# Patient Record
Sex: FEMALE | Race: WHITE | URBAN - METROPOLITAN AREA
[De-identification: names, ages, dates, MRNs, and addresses within clinical notes are randomized per-mention and may not be internally consistent; named-entity substitution may affect disease eponyms.]

---

## 2022-02-10 LAB
AMB EXT HPV: POSITIVE
AMB EXT HPV: POSITIVE
HIV RESULT OB: NEGATIVE
HIV RESULT OB: NEGATIVE
RAPID PLASMA REAGIN OB: NONREACTIVE
RAPID PLASMA REAGIN OB: NONREACTIVE

## 2022-08-19 ENCOUNTER — TELEPHONE (OUTPATIENT)
Dept: OBGYN CLINIC | Facility: CLINIC | Age: 27
End: 2022-08-19

## 2022-08-19 NOTE — TELEPHONE ENCOUNTER
Patient recently moved to the area from [place]. Is due to deliver on 9/11 and would like to be seen in this office. Records are being faxed. Please call to advise.

## 2022-08-20 ENCOUNTER — TELEPHONE (OUTPATIENT)
Dept: OBGYN CLINIC | Facility: CLINIC | Age: 27
End: 2022-08-20

## 2022-08-20 ENCOUNTER — HOSPITAL ENCOUNTER (OUTPATIENT)
Facility: HOSPITAL | Age: 27
Discharge: HOME | End: 2022-08-20
Attending: OBSTETRICS & GYNECOLOGY | Admitting: OBSTETRICS & GYNECOLOGY

## 2022-08-20 VITALS — SYSTOLIC BLOOD PRESSURE: 112 MMHG | DIASTOLIC BLOOD PRESSURE: 66 MMHG | HEART RATE: 92 BPM

## 2022-08-20 PROBLEM — O47.9 IRREGULAR CONTRACTIONS: Status: ACTIVE | Noted: 2022-08-20

## 2022-08-20 LAB
ALBUMIN SERPL-MCNC: 2.7 G/DL (ref 3.4–5)
ALBUMIN/GLOB SERPL: 0.7 {RATIO} (ref 1–2)
ALP LIVER SERPL-CCNC: 82 U/L
ALT SERPL-CCNC: 13 U/L
AMB EXT HEMATOCRIT: 34.7
AMB EXT HEMATOCRIT: 34.7
AMB EXT HEMOGLOBIN: 11.4
AMB EXT HEMOGLOBIN: 11.4
AMB EXT PLATELETS: 199
AMB EXT PLATELETS: 199
AMB EXT TREPONEMAL ANTIBODIES: NEGATIVE
AMB EXT TREPONEMAL ANTIBODIES: NEGATIVE
ANION GAP SERPL CALC-SCNC: 6 MMOL/L (ref 0–18)
ANTIBODY SCREEN OB: NEGATIVE
ANTIBODY SCREEN OB: NEGATIVE
ANTIBODY SCREEN: NEGATIVE
AST SERPL-CCNC: 11 U/L (ref 15–37)
BASOPHILS # BLD AUTO: 0.01 X10(3) UL (ref 0–0.2)
BASOPHILS NFR BLD AUTO: 0.1 %
BILIRUB SERPL-MCNC: 0.3 MG/DL (ref 0.1–2)
BUN BLD-MCNC: 7 MG/DL (ref 7–18)
BUN/CREAT SERPL: 13.5 (ref 10–20)
CALCIUM BLD-MCNC: 8.5 MG/DL (ref 8.5–10.1)
CHLORIDE SERPL-SCNC: 108 MMOL/L (ref 98–112)
CO2 SERPL-SCNC: 23 MMOL/L (ref 21–32)
CREAT BLD-MCNC: 0.52 MG/DL
DEPRECATED RDW RBC AUTO: 46.9 FL (ref 35.1–46.3)
EOSINOPHIL # BLD AUTO: 0 X10(3) UL (ref 0–0.7)
EOSINOPHIL NFR BLD AUTO: 0 %
ERYTHROCYTE [DISTWIDTH] IN BLOOD BY AUTOMATED COUNT: 13.8 % (ref 11–15)
GFR SERPLBLD BASED ON 1.73 SQ M-ARVRAT: 131 ML/MIN/1.73M2 (ref 60–?)
GLOBULIN PLAS-MCNC: 4.1 G/DL (ref 2.8–4.4)
GLUCOSE BLD-MCNC: 95 MG/DL (ref 70–99)
HCT VFR BLD AUTO: 34.7 %
HEPATITIS B SURFACE ANTIGEN OB: NEGATIVE
HEPATITIS B SURFACE ANTIGEN OB: NEGATIVE
HGB BLD-MCNC: 11.4 G/DL
HIV RESULT OB: NEGATIVE
HIV RESULT OB: NEGATIVE
IMM GRANULOCYTES # BLD AUTO: 0.04 X10(3) UL (ref 0–1)
IMM GRANULOCYTES NFR BLD: 0.4 %
LYMPHOCYTES # BLD AUTO: 1.41 X10(3) UL (ref 1–4)
LYMPHOCYTES NFR BLD AUTO: 14.5 %
MCH RBC QN AUTO: 30.4 PG (ref 26–34)
MCHC RBC AUTO-ENTMCNC: 32.9 G/DL (ref 31–37)
MCV RBC AUTO: 92.5 FL
MONOCYTES # BLD AUTO: 0.55 X10(3) UL (ref 0.1–1)
MONOCYTES NFR BLD AUTO: 5.7 %
NEUTROPHILS # BLD AUTO: 7.72 X10 (3) UL (ref 1.5–7.7)
NEUTROPHILS # BLD AUTO: 7.72 X10(3) UL (ref 1.5–7.7)
NEUTROPHILS NFR BLD AUTO: 79.3 %
OSMOLALITY SERPL CALC.SUM OF ELEC: 282 MOSM/KG (ref 275–295)
PLATELET # BLD AUTO: 199 10(3)UL (ref 150–450)
POTASSIUM SERPL-SCNC: 3.7 MMOL/L (ref 3.5–5.1)
PROT SERPL-MCNC: 6.8 G/DL (ref 6.4–8.2)
RBC # BLD AUTO: 3.75 X10(6)UL
RH BLOOD TYPE: POSITIVE
RH FACTOR OB: POSITIVE
RH FACTOR OB: POSITIVE
RUBV IGG SER-ACNC: 8.2 IU/ML (ref 10–?)
SODIUM SERPL-SCNC: 137 MMOL/L (ref 136–145)
WBC # BLD AUTO: 9.7 X10(3) UL (ref 4–11)

## 2022-08-20 PROCEDURE — 59025 FETAL NON-STRESS TEST: CPT | Performed by: OBSTETRICS & GYNECOLOGY

## 2022-08-20 NOTE — PROGRESS NOTES
Pt is a 32year old female admitted to TR2/TR2-A. Patient presents with:  Contractions: every 5 min for past few days  R/o Pih: c/o headache for past couple days. Pt has not taken Tylenol     Pt is [id number] 36w6d intra-uterine pregnancy. History obtained, consents signed. Oriented to room, staff, and plan of care.

## 2022-08-21 LAB — RH BLOOD TYPE: POSITIVE

## 2022-08-21 NOTE — TRIAGE
99 [facility] Patient Status:  Observation    1995 MRN [id number]   Location [address] [place] [address] Attending Danish Goodwin MD   Hosp Day # 0 PCP No primary care provider on file. Date of Admission:  2022    HPI:an estimated date of delivery of: 2022, by Patient Reported     Gwen Lopez is a 32year old.o. [id number]  female with No LMP recorded. Patient is pregnant. and with an estimated date of delivery of 2022, per patient by LMP consistent with ultrasound and current EGA of 52Y1A    Gwen Lopez presents with some mild cramping. She states that she moved here from [place] approximately 1 month ago and has not had any prenatal care here so wanted to come in and be checked out. She states that she had prenatal care in [place] but does not have a copy of her records. She has not been able to get into an office for prenatal care because she does not have records available. She has had normal fetal movement. She denies any significant contractions or bleeding. Patient denies spontaneous rupture membranes. Prenatal Care   Prenatal Care in [place] until 1 month ago  Prenatal Labs were all ordered in case patient returns in labor  Prenatal Results     Initial     Test Value Reference Range Date Time    Blood Type (ABO Group)  O   22    Rh Factor  Positive   22    Antibody Screen (Required questions in OE to answer)        HCT        HGB        MCV        Platelets        Rubella  Equivocal  Positive 22    TREP        RPR ([facility])        Urine Culture        Hepatitis B        HIV Combo        HCV                         Legend    ^: Historical                      Complications:  1. [address] multigravid pregnancy  2.   History of  delivery with a 36-week delivery, 4 deliveries that were term    History   Obstetric History:   OB History    Para Term  AB Living   7 5 4 1 1 5   SAB IAB Ectopic Multiple Live Births                  # Outcome Date GA Lbr Jonn/2nd Weight Sex Delivery Anes PTL Lv   7 Current            6 Term 20           5 Term 17     NORMAL SPONT      4 AB 16     SAB      3 Term 06/13/15     NORMAL SPONT      2  13     NORMAL SPONT      1 Term 10/02/12     NORMAL SPONT        Past Medical History: History reviewed. No pertinent past medical history. Past Social History: History reviewed. No pertinent surgical history. Family History: No family history on file. Social History: Social History    Tobacco Use      Smoking status: Never Smoker      Smokeless tobacco: Never Used    Alcohol use: Not Currently     Allergies/Medications: Allergies:   No Known Allergies  Medications:  No medications prior to admission.     Review of Systems:   As documented in HPI    Physical Exam:   Pulse:  [92] 92  BP: (112)/(66) 112/66  Patient Vitals for the past 24 hrs:   BP Pulse   22 1815 112/66 92       Constitutional: alert and cooperative  Respiratory: clear to auscultation bilaterally  Cardiac: regular rate and rhythm  Abdomen: Gravid, nontender uterus with fundal height approximately 3536 cm  Fetal Surveillance:   140 BPM; Fetal heart variability: moderate  Fetal Heart Rate decelerations: none  Fetal Heart Rate accelerations: yes  Uterine contractions: Infrequent  Sterile vaginal exam: Dilation: 1+ centimeters dilated  Effacement: [address]: -2  Position: [address] [facility] admission results:   @  Recent Results (from the past 24 hour(s))   Comp Metabolic Panel (14)    Collection Time: 22  6:59 PM   Result Value Ref Range    Glucose 95 70 - 99 mg/dL    Sodium 137 136 - 145 mmol/L    Potassium 3.7 3.5 - 5.1 mmol/L    Chloride 108 98 - 112 mmol/L    CO2 23.0 21.0 - 32.0 mmol/L    Anion Gap 6 0 - 18 mmol/L    BUN 7 7 - 18 mg/dL    Creatinine 0.52 (L) 0.55 - 1.02 mg/dL    BUN/CREA Ratio 13.5 10.0 - 20.0    Calcium, Total 8.5 8.5 - 10.1 mg/dL    Calculated Osmolality 282 275 - 295 mOsm/kg    eGFR-Cr 131 >=60 mL/min/1.73m2    ALT 13 13 - 56 U/L    AST 11 (L) 15 - 37 U/L    Alkaline Phosphatase 82 37 - 98 U/L    Bilirubin, Total 0.3 0.1 - 2.0 mg/dL    Total Protein 6.8 6.4 - 8.2 g/dL    Albumin 2.7 (L) 3.4 - 5.0 g/dL    Globulin  4.1 2.8 - 4.4 g/dL    A/G Ratio 0.7 (L) 1.0 - 2.0   Rubella, IGG    Collection Time: 08/20/22  6:59 PM   Result Value Ref Range    Rubella IgG Quantitative 8.868244 (L) >=10 IU/mL    Rubella IgG Equivocal (A) Positive   ABORH (Blood Type)    Collection Time: 08/20/22  6:59 PM   Result Value Ref Range    ABO BLOOD TYPE O     RH BLOOD TYPE Positive    Antibody Screen    Collection Time: 08/20/22  6:59 PM   Result Value Ref Range    Antibody Screen Negative    CBC W/ DIFFERENTIAL    Collection Time: 08/20/22  6:59 PM   Result Value Ref Range    WBC 9.7 4.0 - 11.0 x10(3) uL    RBC 3.75 (L) 3.80 - 5.30 x10(6)uL    HGB 11.4 (L) 12.0 - 16.0 g/dL    HCT 34.7 (L) 35.0 - 48.0 %    MCV 92.5 80.0 - 100.0 fL    MCH 30.4 26.0 - 34.0 pg    MCHC 32.9 31.0 - 37.0 g/dL    RDW-SD 46.9 (H) 35.1 - 46.3 fL    RDW 13.8 11.0 - 15.0 %    .0 150.0 - 450.0 10(3)uL    Neutrophil Absolute Prelim 7.72 (H) 1.50 - 7.70 x10 (3) uL    Neutrophil Absolute 7.72 (H) 1.50 - 7.70 x10(3) uL    Lymphocyte Absolute 1.41 1.00 - 4.00 x10(3) uL    Monocyte Absolute 0.55 0.10 - 1.00 x10(3) uL    Eosinophil Absolute 0.00 0.00 - 0.70 x10(3) uL    Basophil Absolute 0.01 0.00 - 0.20 x10(3) uL    Immature Granulocyte Absolute 0.04 0.00 - 1.00 x10(3) uL    Neutrophil % 79.3 %    Lymphocyte % 14.5 %    Monocyte % 5.7 %    Eosinophil % 0.0 %    Basophil % 0.1 %    Immature Granulocyte % 0.4 %   HIV Ag/Ab Combo    Collection Time: 08/20/22  6:59 PM   Result Value Ref Range    HIV Antigen Antibody Combo Non-Reactive Non-Reactive   @    Prenatal results:     Lab Results   Component Value Date    HIV Non-Reactive 08/20/2022    ABO O 08/20/2022    RH Positive 08/20/2022 WBC 9.7 08/20/2022    HGB 11.4 (L) 08/20/2022    HCT 34.7 (L) 08/20/2022    .0 08/20/2022    CREATSERUM 0.52 (L) 08/20/2022    BUN 7 08/20/2022     08/20/2022    K 3.7 08/20/2022     08/20/2022    CO2 23.0 08/20/2022    GLU 95 08/20/2022    CA 8.5 08/20/2022    ALB 2.7 (L) 08/20/2022    ALKPHO 82 08/20/2022    BILT 0.3 08/20/2022    TP 6.8 08/20/2022    AST 11 (L) 08/20/2022    ALT 13 08/20/2022         Assessment/Plan:    Jaime Oviedo is a A: 32 y.o. [id number] at an estimated gestational age of 36w7d with an estimated date of delivery of 9/11/2022, by Patient Reported presented with irregular contractions, not in labor. Fetal heart tones reassuring with category 1 tracing. Patient with no current prenatal care but states she had previous prenatal care up to approximately 32 weeks. Initial prenatal labs were drawn in the event the patient returns in labor. I counseled patient on recommendation to get records in a contact offices to look for prenatal care. Initial prenatal labs were ordered.     Admission problem(s):    Irregular contractions      Arnoldo Marina MD, MD  8/20/2022  10:15 PM

## 2022-08-21 NOTE — PROGRESS NOTES
Discharged to home per ambulatory in stable condition with written and verbal instructions. Encouraged pt to RTC for decreased fetal movement, vaginal bleeding, leaking of vaginal fluid, or more than 4 contractions in one hour until midnight and then until contractions Q 5min. Patient verbalizes understanding of information given.      Poonam Mcintyre RN

## 2022-08-21 NOTE — TRIAGE
[facility] [place] [facility]      Triage Note    Kari Record Patient Status:  Observation    1995 MRN [id number]   Location [address] Attending Angelica Patino MD   Hosp Day # 0 PCP No primary care provider on file.      Onelia Primus: [id number]  Estimated Date of Delivery: 22  Gestation: 36w6d    Chief Complaint     Contractions; R/o Pih          Allergies:  No Known Allergies    Orders Placed This Encounter      CBC With Differential With Platelet      Comp Metabolic Panel (14)      Rubella, IGG      HIV Ag/Ab Combo      T Pallidum Screening [place]      HIV Ag/Ab Combo      HIV Ag/Ab Combo Lavender Hold      Type and screen      ABORH (Blood Type)      Antibody Screen      ABORH Confirmation      Strep B Culture      Lab Results   Component Value Date    WBC 9.7 2022    HGB 11.4 (L) 2022    HCT 34.7 (L) 2022    .0 2022    CREATSERUM 0.52 (L) 2022    BUN 7 2022     2022    K 3.7 2022     2022    CO2 23.0 2022    GLU 95 2022    CA 8.5 2022    ALB 2.7 (L) 2022    ALKPHO 82 2022    BILT 0.3 2022    TP 6.8 2022    AST 11 (L) 2022    ALT 13 2022       Clinitek UA  No results found for: Alric Leonardo, GLUUR, URINEBILI, URINEKETONE, SPECGRAVITY, PHUR, PROTURINE, UROBILI, URINENITRITE, [place], URINECUL    UA  No results found for: COLORUR, CLARITY, SPECGRAVITY, PROUR, GLUUR, KETUR, BILUR, BLOODURINE, NITRITE, UROBILINOGEN, LEUUR, UASA     22  1815   BP: 112/66   Pulse: 92       NST  Variability: Moderate           Accelerations: Yes           Decelerations: None            Baseline: 135 BPM           Uterine Irritability: No           Contractions: Irregular                                                    Nonstress Test Interpretation: Reactive           Nonstress Test Second Interpretation: Reactive          FHR Category: Category I             Additional Comments Comments: [id number] presenting with c/o UCs, SVE 1.5/0/-2, reports UCs mild, just moved from [place] and unable to establish care, routine OB labs ordered, labs unremarkable and pt cleared for DC home     Patient presents with:  Contractions: every 5 min for past few days  R/o Pih: c/o headache for past couple days. Pt has not taken Tylenol    Labor danger S&S rev'd. Contact info provided for [address] [place] [address] ob providers. Enc pt to establish [facility].     Pola Tai RN  8/20/2022 9:32 PM

## 2022-08-22 LAB
AMB EXT GLUCOSE 1HR OB: 144
AMB EXT GLUCOSE 1HR OB: 144
GROUP B STREP BY PCR FOR PCR OVT: POSITIVE
T PALLIDUM AB SER QL: NEGATIVE

## 2022-08-24 PROBLEM — O99.820 GROUP B STREPTOCOCCAL CARRIAGE COMPLICATING PREGNANCY: Status: ACTIVE | Noted: 2022-08-24

## 2022-08-25 LAB
AMB EXT 1 HR GLUCOSE GESTATIONAL: 172
AMB EXT 1 HR GLUCOSE GESTATIONAL: 172
AMB EXT 2 HR GLUCOSE GESTATIONAL: 111
AMB EXT 2 HR GLUCOSE GESTATIONAL: 111
AMB EXT 3 HR GLUCOSE GESTATIONAL: 61
AMB EXT 3 HR GLUCOSE GESTATIONAL: 61
AMB EXT FASTING GLUCOSE GESTATIONAL: 85
AMB EXT FASTING GLUCOSE GESTATIONAL: 85

## 2022-08-31 ENCOUNTER — HOSPITAL ENCOUNTER (EMERGENCY)
Facility: HOSPITAL | Age: 27
Discharge: STILL ADMITTED OR ADMINISTRATIVE | End: 2022-08-31
Payer: MEDICAID

## 2022-08-31 ENCOUNTER — HOSPITAL ENCOUNTER (OUTPATIENT)
Facility: HOSPITAL | Age: 27
Discharge: HOME | End: 2022-08-31
Attending: OBSTETRICS & GYNECOLOGY | Admitting: OBSTETRICS & GYNECOLOGY
Payer: MEDICAID

## 2022-08-31 ENCOUNTER — HOSPITAL ENCOUNTER (EMERGENCY)
Facility: HOSPITAL | Age: 27
Discharge: STILL ADMITTED OR ADMINISTRATIVE | End: 2022-09-01
Payer: MEDICAID

## 2022-08-31 ENCOUNTER — HOSPITAL ENCOUNTER (OUTPATIENT)
Facility: HOSPITAL | Age: 27
Discharge: HOME | End: 2022-08-31
Attending: OBSTETRICS & GYNECOLOGY | Admitting: OBSTETRICS & GYNECOLOGY

## 2022-08-31 VITALS
BODY MASS INDEX: 30.18 KG/M2 | HEART RATE: 88 BPM | TEMPERATURE: 98 F | SYSTOLIC BLOOD PRESSURE: 102 MMHG | DIASTOLIC BLOOD PRESSURE: 56 MMHG | RESPIRATION RATE: 16 BRPM | WEIGHT: 164 LBS | HEIGHT: 62 IN

## 2022-08-31 VITALS — HEART RATE: 98 BPM | WEIGHT: 168 LBS | DIASTOLIC BLOOD PRESSURE: 66 MMHG | SYSTOLIC BLOOD PRESSURE: 116 MMHG

## 2022-08-31 PROBLEM — Z34.90 PREGNANCY: Status: ACTIVE | Noted: 2022-08-31

## 2022-08-31 LAB
ANTIBODY SCREEN: NEGATIVE
ANTIBODY SCREEN: NEGATIVE
BASOPHILS # BLD AUTO: 0.02 X10(3) UL (ref 0–0.2)
BASOPHILS # BLD AUTO: 0.02 X10(3) UL (ref 0–0.2)
BASOPHILS NFR BLD AUTO: 0.2 %
BASOPHILS NFR BLD AUTO: 0.2 %
DEPRECATED RDW RBC AUTO: 48.6 FL (ref 35.1–46.3)
DEPRECATED RDW RBC AUTO: 48.6 FL (ref 35.1–46.3)
EOSINOPHIL # BLD AUTO: 0.02 X10(3) UL (ref 0–0.7)
EOSINOPHIL # BLD AUTO: 0.02 X10(3) UL (ref 0–0.7)
EOSINOPHIL NFR BLD AUTO: 0.2 %
EOSINOPHIL NFR BLD AUTO: 0.2 %
ERYTHROCYTE [DISTWIDTH] IN BLOOD BY AUTOMATED COUNT: 14.4 % (ref 11–15)
ERYTHROCYTE [DISTWIDTH] IN BLOOD BY AUTOMATED COUNT: 14.4 % (ref 11–15)
HBV SURFACE AG SER-ACNC: <0.1 [IU]/L
HBV SURFACE AG SER-ACNC: <0.1 [IU]/L
HBV SURFACE AG SERPL QL IA: NONREACTIVE
HBV SURFACE AG SERPL QL IA: NONREACTIVE
HCT VFR BLD AUTO: 34.9 %
HCT VFR BLD AUTO: 34.9 %
HGB BLD-MCNC: 11.2 G/DL
HGB BLD-MCNC: 11.2 G/DL
IMM GRANULOCYTES # BLD AUTO: 0.04 X10(3) UL (ref 0–1)
IMM GRANULOCYTES # BLD AUTO: 0.04 X10(3) UL (ref 0–1)
IMM GRANULOCYTES NFR BLD: 0.3 %
IMM GRANULOCYTES NFR BLD: 0.3 %
LYMPHOCYTES # BLD AUTO: 1.13 X10(3) UL (ref 1–4)
LYMPHOCYTES # BLD AUTO: 1.13 X10(3) UL (ref 1–4)
LYMPHOCYTES NFR BLD AUTO: 9.1 %
LYMPHOCYTES NFR BLD AUTO: 9.1 %
MCH RBC QN AUTO: 29.8 PG (ref 26–34)
MCH RBC QN AUTO: 29.8 PG (ref 26–34)
MCHC RBC AUTO-ENTMCNC: 32.1 G/DL (ref 31–37)
MCHC RBC AUTO-ENTMCNC: 32.1 G/DL (ref 31–37)
MCV RBC AUTO: 92.8 FL
MCV RBC AUTO: 92.8 FL
MONOCYTES # BLD AUTO: 0.57 X10(3) UL (ref 0.1–1)
MONOCYTES # BLD AUTO: 0.57 X10(3) UL (ref 0.1–1)
MONOCYTES NFR BLD AUTO: 4.6 %
MONOCYTES NFR BLD AUTO: 4.6 %
NEUTROPHILS # BLD AUTO: 10.6 X10 (3) UL (ref 1.5–7.7)
NEUTROPHILS # BLD AUTO: 10.6 X10 (3) UL (ref 1.5–7.7)
NEUTROPHILS # BLD AUTO: 10.6 X10(3) UL (ref 1.5–7.7)
NEUTROPHILS # BLD AUTO: 10.6 X10(3) UL (ref 1.5–7.7)
NEUTROPHILS NFR BLD AUTO: 85.6 %
NEUTROPHILS NFR BLD AUTO: 85.6 %
PLATELET # BLD AUTO: 211 10(3)UL (ref 150–450)
PLATELET # BLD AUTO: 211 10(3)UL (ref 150–450)
RBC # BLD AUTO: 3.76 X10(6)UL
RBC # BLD AUTO: 3.76 X10(6)UL
RH BLOOD TYPE: POSITIVE
RH BLOOD TYPE: POSITIVE
SARS-COV-2 RNA RESP QL NAA+PROBE: NOT DETECTED
SARS-COV-2 RNA RESP QL NAA+PROBE: NOT DETECTED
WBC # BLD AUTO: 12.4 X10(3) UL (ref 4–11)
WBC # BLD AUTO: 12.4 X10(3) UL (ref 4–11)

## 2022-08-31 PROCEDURE — 85025 COMPLETE CBC W/AUTO DIFF WBC: CPT | Performed by: OBSTETRICS & GYNECOLOGY

## 2022-08-31 PROCEDURE — 96360 HYDRATION IV INFUSION INIT: CPT

## 2022-08-31 PROCEDURE — 86901 BLOOD TYPING SEROLOGIC RH(D): CPT | Performed by: OBSTETRICS & GYNECOLOGY

## 2022-08-31 PROCEDURE — 96361 HYDRATE IV INFUSION ADD-ON: CPT

## 2022-08-31 PROCEDURE — 99203 OFFICE O/P NEW LOW 30 MIN: CPT

## 2022-08-31 PROCEDURE — 59025 FETAL NON-STRESS TEST: CPT

## 2022-08-31 PROCEDURE — 99214 OFFICE O/P EST MOD 30 MIN: CPT

## 2022-08-31 PROCEDURE — 36415 COLL VENOUS BLD VENIPUNCTURE: CPT

## 2022-08-31 PROCEDURE — 86850 RBC ANTIBODY SCREEN: CPT | Performed by: OBSTETRICS & GYNECOLOGY

## 2022-08-31 PROCEDURE — 87340 HEPATITIS B SURFACE AG IA: CPT | Performed by: OBSTETRICS & GYNECOLOGY

## 2022-08-31 PROCEDURE — 86900 BLOOD TYPING SEROLOGIC ABO: CPT | Performed by: OBSTETRICS & GYNECOLOGY

## 2022-08-31 RX ORDER — ACETAMINOPHEN 500 MG
1000 TABLET ORAL EVERY 6 HOURS PRN
COMMUNITY

## 2022-08-31 RX ORDER — TERBUTALINE SULFATE 1 MG/ML
0.25 INJECTION, SOLUTION SUBCUTANEOUS AS NEEDED
Status: DISCONTINUED | OUTPATIENT
Start: 2022-08-31 | End: 2022-08-31

## 2022-08-31 RX ORDER — DEXTROSE, SODIUM CHLORIDE, SODIUM LACTATE, POTASSIUM CHLORIDE, AND CALCIUM CHLORIDE 5; .6; .31; .03; .02 G/100ML; G/100ML; G/100ML; G/100ML; G/100ML
INJECTION, SOLUTION INTRAVENOUS AS NEEDED
Status: DISCONTINUED | OUTPATIENT
Start: 2022-08-31 | End: 2022-08-31

## 2022-08-31 RX ORDER — ONDANSETRON 2 MG/ML
4 INJECTION INTRAMUSCULAR; INTRAVENOUS EVERY 6 HOURS PRN
Status: DISCONTINUED | OUTPATIENT
Start: 2022-08-31 | End: 2022-08-31

## 2022-08-31 RX ORDER — LIDOCAINE HYDROCHLORIDE 10 MG/ML
30 INJECTION, SOLUTION EPIDURAL; INFILTRATION; INTRACAUDAL; PERINEURAL ONCE
Status: DISCONTINUED | OUTPATIENT
Start: 2022-08-31 | End: 2022-08-31

## 2022-08-31 RX ORDER — SODIUM CHLORIDE, SODIUM LACTATE, POTASSIUM CHLORIDE, CALCIUM CHLORIDE 600; 310; 30; 20 MG/100ML; MG/100ML; MG/100ML; MG/100ML
INJECTION, SOLUTION INTRAVENOUS CONTINUOUS
Status: DISCONTINUED | OUTPATIENT
Start: 2022-08-31 | End: 2022-08-31

## 2022-08-31 RX ORDER — ACETAMINOPHEN 500 MG
500 TABLET ORAL EVERY 6 HOURS PRN
Status: DISCONTINUED | OUTPATIENT
Start: 2022-08-31 | End: 2022-08-31

## 2022-08-31 RX ORDER — AMMONIA INHALANTS 0.04 G/.3ML
0.3 INHALANT RESPIRATORY (INHALATION) AS NEEDED
Status: DISCONTINUED | OUTPATIENT
Start: 2022-08-31 | End: 2022-08-31

## 2022-08-31 RX ORDER — IBUPROFEN 600 MG/1
600 TABLET ORAL EVERY 6 HOURS PRN
Status: DISCONTINUED | OUTPATIENT
Start: 2022-08-31 | End: 2022-08-31

## 2022-08-31 RX ORDER — TRISODIUM CITRATE DIHYDRATE AND CITRIC ACID MONOHYDRATE 500; 334 MG/5ML; MG/5ML
30 SOLUTION ORAL AS NEEDED
Status: DISCONTINUED | OUTPATIENT
Start: 2022-08-31 | End: 2022-08-31

## 2022-08-31 NOTE — PROGRESS NOTES
Pt is a 32year old female admitted to TR3/TR3-A. Patient presents with:  R/o Labor     Pt is [id number] 38w3d intra-uterine pregnancy. History obtained, consents signed. Oriented to room, staff, and plan of care.

## 2022-09-01 NOTE — PROGRESS NOTES
Discharged to home per ambulatory in stable condition with written and verbal instructions. Encouraged pt to RTC for decreased fetal movement, vaginal bleeding, leaking of vaginal fluid, or more strong regular contractions. Patient verbalizes understanding of information given.      Christine Kay RN

## 2022-09-01 NOTE — TRIAGE
[facility]      Triage Note    Evaristo Pallas Patient Status:  Outpatient    1995 MRN [id number]   Location [address] Attending Valeria Bright MD   Hosp Day # 0 PCP No primary care provider on file.      Beatriz Link: [id number]  Estimated Date of Delivery: 22  Gestation: 38w0d    Chief Complaint     R/o Labor          Allergies:  No Known Allergies    Orders Placed This Encounter      CBC With Differential With Platelet      Antibody Identification (titer)      Rubella, IGG      ABO GROUPING      RPR W/Conf      Hepatitis B Surface Antigen      Rapid HIV      Rapid HIV      T Pallidum Screening [place]      Hpv High Risk , Thin Prep Collect      Glucose Tolerance, 50 gm (1 hr), Gestational (ADA)      Glucose Tolerance Test 3 Hr Glucose Tolerance, 75 gm (0 hr, 1 hr, 2hr, 3hr), Non-gestational      Conventional Pap Smear      Lab Results   Component Value Date    HGB 11.4 2022    HCT 34.7 2022     2022       Clinitek UA  No results found for: URCOLOR, URCLA, GLUUR, URINEBILI, URINEKETONE, SPECGRAVITY, PHUR, PROTURINE, UROBILI, URINENITRITE, [place], URINECUL    UA  No results found for: [place], CLARITY, SPECGRAVITY, PROUR, [place], KETUR, BILUR, BLOODURINE, NITRITE, UROBILINOGEN, LEUUR, UASA     22  1530 22  1945   BP: 116/66    Pulse: 98    Weight:  76.2 kg (168 lb)       NST  Variability: Moderate           Accelerations: Yes           Decelerations: None            Baseline: 135 BPM           Uterine Irritability: No           Contractions: Regular                    Contraction Frequency: 3-5 min                   Acoustic Stimulator: No           Nonstress Test Interpretation: Reactive           Nonstress Test Second Interpretation: Reactive          FHR Category: Category I             Additional Comments Comments: [id number] presenting for r/o labor, SVE essentially unchanged after 4 hours of walking, rev'd pt with  Roberto pt cleared for DC home     Patient presents with:  R/o Labor: cxs since last night        Gregor Cotton RN  8/31/2022 9:20 PM

## 2022-09-01 NOTE — H&P
99 [facility] Patient Status:  Inpatient    1995 MRN [id number]   Location [address] [place] [address] Attending No att. providers found   [facility] Day # 1 PCP No primary care provider on file. Date of Admission:        HPI:   Miri Walden is a 32year old  female, current EGA of 38w1d with an estimated date of delivery of: 2022, by Patient Reported      Miri Walden is being admitted for uc's q 3 mins on  in early am.    Her current obstetrical history is significant for multip, left fetal renal agenesis. Patient reports good fetal movement . Fetal Movement: normal.     History   Obstetric History:   OB History    Para Term  AB Living   7 5 4 1 1 5   SAB IAB Ectopic Multiple Live Births                  # Outcome Date GA Lbr Jonn/2nd Weight Sex Delivery Anes PTL Lv   7 Current            6 Term 20           5 Term 17     NORMAL SPONT      4 AB 16     SAB      3 Term 06/13/15     NORMAL SPONT      2  13     NORMAL SPONT      1 Term 10/02/12     NORMAL SPONT        Past Medical History: History reviewed. No pertinent past medical history. Past Social History: History reviewed. No pertinent surgical history. Family History: History reviewed. No pertinent family history. Social History: Social History    Tobacco Use      Smoking status: Never Smoker      Smokeless tobacco: Never Used    Alcohol use: Not Currently       Allergies/Medications: Allergies:   No Known Allergies  Medications:  No medications prior to admission.       Review of Systems:   As documented in HPI    no complaints    Physical Exam:        Constitutional: alert, appears stated age and cooperative  Respiratory: clear to auscultation bilaterally  Cardiac: regular rate and rhythm, S1, S2 normal, no murmur, click, rub or gallop  Abdomen: FHT present  Fetal Surveillance:  140 BPM; Fetal heart variability: moderate  Fetal Heart Rate decelerations: none  Fetal Heart Rate accelerations: yes  Sterile speculum exam:   Sterile vaginal exam: 4 cm    Results:     Lab Results   Component Value Date    TREPONEMALAB Negative 08/20/2022    ABO O 08/31/2022    RH Positive 08/31/2022    WBC 12.4 (H) 08/31/2022    HGB 11.2 (L) 08/31/2022    HCT 34.9 (L) 08/31/2022    .0 08/31/2022    CREATSERUM 0.52 (L) 08/20/2022    BUN 7 08/20/2022     08/20/2022    K 3.7 08/20/2022     08/20/2022    CO2 23.0 08/20/2022    GLU 95 08/20/2022    CA 8.5 08/20/2022    ALB 2.7 (L) 08/20/2022    ALKPHO 82 08/20/2022    BILT 0.3 08/20/2022    TP 6.8 08/20/2022    AST 11 (L) 08/20/2022    ALT 13 08/20/2022       No results found for: DDIMER, ESRML, ESRPF, CRP, BNP, MG, PHOS, TROP, CK, CKMB, NELA, RPR, B12, ETOH, COLORUR, CLARITY, SPECGRAVITY, PROUR, GLUUR, KETUR, BILUR, BLOODURINE, NITRITE, UROBILINOGEN, LEUUR, UASA, POCGLU, BLDCUL, BLDSMR    No results found. Assessment/Plan:    Erin Marquez is at an estimated gestational age of 43w4d with an estimated date of delivery of:  9/14/2022, by Patient Reported        Admission problem(s):    Pregnancy  uc's persisted despite iv hydration , and b/c of advanced dilation she was admitted 21 obs, but made no cervical change and discharged home          Risks, benefits, alternatives and possible complications have been discussed in detail with the patient. Pre-admission, admission, and post admission procedures and expectations were discussed in detail. All questions answered, all appropriate consents will be signed at the Hospital. Admission is planned for . Tricia Baeza MD   9/1/2022  7:33 AM

## 2022-09-03 ENCOUNTER — HOSPITAL ENCOUNTER (INPATIENT)
Facility: HOSPITAL | Age: 27
LOS: 2 days | Discharge: HOME | End: 2022-09-05
Attending: OBSTETRICS & GYNECOLOGY | Admitting: OBSTETRICS & GYNECOLOGY
Payer: MEDICAID

## 2022-09-03 ENCOUNTER — ANESTHESIA EVENT (OUTPATIENT)
Dept: OBGYN UNIT | Facility: HOSPITAL | Age: 27
End: 2022-09-03
Payer: MEDICAID

## 2022-09-03 ENCOUNTER — ANESTHESIA (OUTPATIENT)
Dept: OBGYN UNIT | Facility: HOSPITAL | Age: 27
End: 2022-09-03
Payer: MEDICAID

## 2022-09-03 LAB
ANTIBODY SCREEN: NEGATIVE
BASOPHILS # BLD AUTO: 0.02 X10(3) UL (ref 0–0.2)
BASOPHILS NFR BLD AUTO: 0.1 %
DEPRECATED RDW RBC AUTO: 48.9 FL (ref 35.1–46.3)
EOSINOPHIL # BLD AUTO: 0.02 X10(3) UL (ref 0–0.7)
EOSINOPHIL NFR BLD AUTO: 0.1 %
ERYTHROCYTE [DISTWIDTH] IN BLOOD BY AUTOMATED COUNT: 14.5 % (ref 11–15)
HCT VFR BLD AUTO: 36.6 %
HGB BLD-MCNC: 11.8 G/DL
IMM GRANULOCYTES # BLD AUTO: 0.06 X10(3) UL (ref 0–1)
IMM GRANULOCYTES NFR BLD: 0.4 %
LYMPHOCYTES # BLD AUTO: 1.08 X10(3) UL (ref 1–4)
LYMPHOCYTES NFR BLD AUTO: 8 %
MCH RBC QN AUTO: 29.9 PG (ref 26–34)
MCHC RBC AUTO-ENTMCNC: 32.2 G/DL (ref 31–37)
MCV RBC AUTO: 92.7 FL
MONOCYTES # BLD AUTO: 0.64 X10(3) UL (ref 0.1–1)
MONOCYTES NFR BLD AUTO: 4.8 %
NEUTROPHILS # BLD AUTO: 11.62 X10 (3) UL (ref 1.5–7.7)
NEUTROPHILS # BLD AUTO: 11.62 X10(3) UL (ref 1.5–7.7)
NEUTROPHILS NFR BLD AUTO: 86.6 %
PLATELET # BLD AUTO: 235 10(3)UL (ref 150–450)
RBC # BLD AUTO: 3.95 X10(6)UL
RH BLOOD TYPE: POSITIVE
RH BLOOD TYPE: POSITIVE
SARS-COV-2 RNA RESP QL NAA+PROBE: NOT DETECTED
WBC # BLD AUTO: 13.4 X10(3) UL (ref 4–11)

## 2022-09-03 PROCEDURE — 59409 OBSTETRICAL CARE: CPT | Performed by: OBSTETRICS & GYNECOLOGY

## 2022-09-03 RX ORDER — DOCUSATE SODIUM 100 MG/1
100 CAPSULE, LIQUID FILLED ORAL
Status: DISCONTINUED | OUTPATIENT
Start: 2022-09-03 | End: 2022-09-05

## 2022-09-03 RX ORDER — BUPIVACAINE HYDROCHLORIDE 2.5 MG/ML
20 INJECTION, SOLUTION EPIDURAL; INFILTRATION; INTRACAUDAL ONCE
Status: DISCONTINUED | OUTPATIENT
Start: 2022-09-03 | End: 2022-09-03

## 2022-09-03 RX ORDER — NALBUPHINE HCL 10 MG/ML
2.5 AMPUL (ML) INJECTION
Status: DISCONTINUED | OUTPATIENT
Start: 2022-09-03 | End: 2022-09-03

## 2022-09-03 RX ORDER — BUPIVACAINE HYDROCHLORIDE 2.5 MG/ML
INJECTION, SOLUTION EPIDURAL; INFILTRATION; INTRACAUDAL AS NEEDED
Status: DISCONTINUED | OUTPATIENT
Start: 2022-09-03 | End: 2022-09-03 | Stop reason: SURG

## 2022-09-03 RX ORDER — SODIUM CHLORIDE, SODIUM LACTATE, POTASSIUM CHLORIDE, CALCIUM CHLORIDE 600; 310; 30; 20 MG/100ML; MG/100ML; MG/100ML; MG/100ML
INJECTION, SOLUTION INTRAVENOUS CONTINUOUS
Status: DISCONTINUED | OUTPATIENT
Start: 2022-09-03 | End: 2022-09-03

## 2022-09-03 RX ORDER — TRISODIUM CITRATE DIHYDRATE AND CITRIC ACID MONOHYDRATE 500; 334 MG/5ML; MG/5ML
30 SOLUTION ORAL AS NEEDED
Status: DISCONTINUED | OUTPATIENT
Start: 2022-09-03 | End: 2022-09-03

## 2022-09-03 RX ORDER — ACETAMINOPHEN 500 MG
500 TABLET ORAL EVERY 6 HOURS PRN
Status: DISCONTINUED | OUTPATIENT
Start: 2022-09-03 | End: 2022-09-03

## 2022-09-03 RX ORDER — IBUPROFEN 600 MG/1
600 TABLET ORAL EVERY 6 HOURS
Status: DISCONTINUED | OUTPATIENT
Start: 2022-09-03 | End: 2022-09-05

## 2022-09-03 RX ORDER — IBUPROFEN 600 MG/1
600 TABLET ORAL EVERY 6 HOURS PRN
Status: DISCONTINUED | OUTPATIENT
Start: 2022-09-03 | End: 2022-09-03

## 2022-09-03 RX ORDER — CHOLECALCIFEROL (VITAMIN D3) 25 MCG
1 TABLET,CHEWABLE ORAL DAILY
Status: DISCONTINUED | OUTPATIENT
Start: 2022-09-03 | End: 2022-09-05

## 2022-09-03 RX ORDER — AMMONIA INHALANTS 0.04 G/.3ML
0.3 INHALANT RESPIRATORY (INHALATION) AS NEEDED
Status: DISCONTINUED | OUTPATIENT
Start: 2022-09-03 | End: 2022-09-05

## 2022-09-03 RX ORDER — ONDANSETRON 2 MG/ML
4 INJECTION INTRAMUSCULAR; INTRAVENOUS EVERY 6 HOURS PRN
Status: DISCONTINUED | OUTPATIENT
Start: 2022-09-03 | End: 2022-09-03

## 2022-09-03 RX ORDER — BISACODYL 10 MG
10 SUPPOSITORY, RECTAL RECTAL ONCE AS NEEDED
Status: DISCONTINUED | OUTPATIENT
Start: 2022-09-03 | End: 2022-09-05

## 2022-09-03 RX ORDER — ACETAMINOPHEN 500 MG
500 TABLET ORAL EVERY 6 HOURS PRN
Status: DISCONTINUED | OUTPATIENT
Start: 2022-09-03 | End: 2022-09-05

## 2022-09-03 RX ORDER — BUPIVACAINE HCL/0.9 % NACL/PF 0.25 %
5 PLASTIC BAG, INJECTION (ML) EPIDURAL AS NEEDED
Status: DISCONTINUED | OUTPATIENT
Start: 2022-09-03 | End: 2022-09-03

## 2022-09-03 RX ORDER — TERBUTALINE SULFATE 1 MG/ML
0.25 INJECTION, SOLUTION SUBCUTANEOUS AS NEEDED
Status: DISCONTINUED | OUTPATIENT
Start: 2022-09-03 | End: 2022-09-03

## 2022-09-03 RX ORDER — AMMONIA INHALANTS 0.04 G/.3ML
0.3 INHALANT RESPIRATORY (INHALATION) AS NEEDED
Status: DISCONTINUED | OUTPATIENT
Start: 2022-09-03 | End: 2022-09-03

## 2022-09-03 RX ORDER — ACETAMINOPHEN 500 MG
1000 TABLET ORAL EVERY 6 HOURS PRN
Status: DISCONTINUED | OUTPATIENT
Start: 2022-09-03 | End: 2022-09-05

## 2022-09-03 RX ORDER — DEXTROSE, SODIUM CHLORIDE, SODIUM LACTATE, POTASSIUM CHLORIDE, AND CALCIUM CHLORIDE 5; .6; .31; .03; .02 G/100ML; G/100ML; G/100ML; G/100ML; G/100ML
INJECTION, SOLUTION INTRAVENOUS AS NEEDED
Status: DISCONTINUED | OUTPATIENT
Start: 2022-09-03 | End: 2022-09-03

## 2022-09-03 RX ORDER — DIAPER,BRIEF,INFANT-TODD,DISP
1 EACH MISCELLANEOUS EVERY 6 HOURS PRN
Status: DISCONTINUED | OUTPATIENT
Start: 2022-09-03 | End: 2022-09-05

## 2022-09-03 RX ORDER — SIMETHICONE 80 MG
80 TABLET,CHEWABLE ORAL 3 TIMES DAILY PRN
Status: DISCONTINUED | OUTPATIENT
Start: 2022-09-03 | End: 2022-09-05

## 2022-09-03 RX ORDER — LIDOCAINE HYDROCHLORIDE 10 MG/ML
30 INJECTION, SOLUTION EPIDURAL; INFILTRATION; INTRACAUDAL; PERINEURAL ONCE
Status: DISCONTINUED | OUTPATIENT
Start: 2022-09-03 | End: 2022-09-03

## 2022-09-03 RX ORDER — ONDANSETRON 2 MG/ML
4 INJECTION INTRAMUSCULAR; INTRAVENOUS EVERY 6 HOURS PRN
Status: DISCONTINUED | OUTPATIENT
Start: 2022-09-03 | End: 2022-09-05

## 2022-09-03 RX ORDER — LIDOCAINE HYDROCHLORIDE AND EPINEPHRINE 15; 5 MG/ML; UG/ML
INJECTION, SOLUTION EPIDURAL
Status: COMPLETED | OUTPATIENT
Start: 2022-09-03 | End: 2022-09-03

## 2022-09-03 RX ADMIN — LIDOCAINE HYDROCHLORIDE AND EPINEPHRINE 3 ML: 15; 5 INJECTION, SOLUTION EPIDURAL at 09:25:00

## 2022-09-03 RX ADMIN — BUPIVACAINE HYDROCHLORIDE 0.5 ML: 2.5 INJECTION, SOLUTION EPIDURAL; INFILTRATION; INTRACAUDAL at 09:24:00

## 2022-09-03 NOTE — L&D DELIVERY NOTE
[facility]    Vaginal Delivery Note    Sandor Trejo Patient Status:  Inpatient    1995 MRN [id number]   Location [address] Attending [address] Carrizales MD   Hosp Day # 0 PCP No primary care provider on file. Delivery     Infant  Date of Delivery: 9/3/2022   Time of Delivery: 10:54 AM  Delivery Type:      Infant Sex/Birthweight: male 6 lb 8.4 oz (2.96 kg)     Presentation    Position          Apgars:  1 minute: 9               5 minutes: 9                        10 minutes:      Placenta  Date/Time of Delivery: 9/3/2022 10:58 AM   Delivery: spontaneous  Placenta to Pathology: no  Cord Gases Submitted: no  Cord Blood Collection: yes  Cord Tissue Collection: no  Cord Complications: none  Sponge and Needle Counts:  Verified yes    Maternal Anesthesia: epidural   Episiotomy/Laceration Repair  Laceration: none    Delivery Complications  none    Neonatologist Present: no  Delivery Comment: infant pink crying and active in warmer      Intake/Output   EBL:  50 ml      Alex Dumont MD   9/3/2022  11:09 AM

## 2022-09-03 NOTE — ANESTHESIA PREPROCEDURE EVALUATION
Anesthesia PreOp Note    HPI:     Sandor Trejo is a 32year old female who presents for preoperative consultation requested by: * No surgeons listed *    Date of Surgery: 9/3/2022    * No procedures listed *  Indication: * No pre-op diagnosis entered *    Relevant Problems   No relevant active problems       NPO:                         History Review: There are no problems to display for this patient. No past medical history on file. No past surgical history on file. No medications prior to admission.     acetaminophen (Tylenol Extra Strength) tab 500 mg, 500 mg, Oral, Q6H PRN, Alex Landis MD  ibuprofen (Motrin) tab 600 mg, 600 mg, Oral, Q6H PRN, Miguel FREEMAN MD  oxyTOCIN in sodium chloride 0.9% (Pitocin) 30 Units/500mL infusion premix, 300 mL/hr, Intravenous, Continuous, Alex Landis MD  terbutaline (Brethine) 1 MG/ML injection 0.25 mg, 0.25 mg, Subcutaneous, PRN, Miguel Hampton MD  sodium citrate-citric acid (Bicitra) 500-334 MG/5ML oral solution 30 mL, 30 mL, Oral, PRN, Alex Landis MD  ondansetron (Zofran) 4 MG/2ML injection 4 mg, 4 mg, Intravenous, Q6H PRN, Conrad Landis MD  ammonia aromatic (ammonia) nasal inhalation 0.3 mL, 0.3 mL, Nasal, PRN, Alex Landis MD  lidocaine PF (Xylocaine) 1% injection, 30 mL, Intradermal, Once, Conrad Saldivar MD  lactated ringers infusion, , Intravenous, Continuous, Miguel Hampton MD  dextrose in lactated ringers 5% infusion, , Intravenous, PRN, Alex Landis MD  ampicillin (Omnipen) 2 g in sodium chloride 0.9% 100 mL IVPB-MBP, 2 g, Intravenous, Once, Desiree Chapman MD, Last Rate: 200 mL/hr at 09/03/22 0901, 2 g at 09/03/22 0901   Followed by  ampicillin (Omnipen) 1 g in sodium chloride 0.9% 100 mL IVPB-MBP, 1 g, Intravenous, Q4H, Miguel Hampton MD  lactated ringers IV bolus 1,000 mL, 1,000 mL, Intravenous, Once, Miriam Venegas MD, Last Rate: 2,000 mL/hr at 09/03/22 0851, 1,000 mL at 09/03/22 0851  fentaNYL-bupivacaine in sodium chloride 0.9% 2 mcg/mL-0.125% EPIDURAL infusion premix, 10 mL/hr, Epidural, Continuous, Celia Ochoa MD  fentaNYL (Sublimaze) 50 mcg/mL injection 100 mcg, 100 mcg, Epidural, Once, Celia Ochoa MD  bupivacaine PF (Marcaine) 0.25% injection, 20 mL, Epidural, Once, Celia Ochoa MD  EPHEDrine (PF) 25 MG/5 ML injection 5 mg, 5 mg, Intravenous, PRN, Celia Ochoa MD  nalbuphine (Nubain) 10 mg/mL injection 2.5 mg, 2.5 mg, Intravenous, Q15 Min PRN, Celia Ochoa MD    No current [facility]-ordered outpatient medications on file. No Known Allergies    No family history on file. Social History    Socioeconomic History      Marital status: Single      Available pre-op labs reviewed. Lab Results   Component Value Date    WBC 13.4 (H) 09/03/2022    RBC 3.95 09/03/2022    HGB 11.8 (L) 09/03/2022    HGB 11.4 08/20/2022    HCT 36.6 09/03/2022    HCT 34.7 08/20/2022    MCV 92.7 09/03/2022    MCH 29.9 09/03/2022    MCHC 32.2 09/03/2022    RDW 14.5 09/03/2022    .0 09/03/2022     08/20/2022             Vital Signs: There is no height or weight on file to calculate BMI.    blood pressure is 130/77 and her pulse is 99.    09/03/22  0830 09/03/22  0845   BP: 131/88 130/77   Pulse: 112 99        Anesthesia Evaluation     Patient summary reviewed and Nursing notes reviewed    No history of anesthetic complications   Airway   Mallampati: II  TM distance: >3 FB  Neck ROM: full  Dental      Pulmonary - negative ROS    breath sounds clear to auscultation  (-) COPD, asthma, sleep apnea  Cardiovascular - negative ROS  Exercise tolerance: good  (-) hypertension, CAD, CHF    Rhythm: regular  Rate: normal    Neuro/Psych - negative ROS   (-) CVA    GI/Hepatic/Renal - negative ROS   (-) GERD, liver disease, renal disease    Endo/Other - negative ROS   (-) diabetes mellitus, hypothyroidism  Abdominal                Anesthesia Plan:   ASA:  2  Plan:   Epidural and spinal  Post-op Pain Management: CSE  Plan Comments: Risks of spinal including infection, hematoma, nerve damage and PDPH explained to pt and pt voiced understanding; all questions answered. Informed Consent Plan and Risks Discussed With:  Patient      I have informed Manuelita Show and/or legal guardian or family member of the nature of the anesthetic plan, benefits, risks including possible dental damage if relevant, major complications, and any alternative forms of anesthetic management. All of the patient's questions were answered to the best of my ability. The patient desires the anesthetic management as planned.   Angus Lawrence MD  9/3/2022 9:13 AM  Present on Admission:  **None**

## 2022-09-03 NOTE — PROGRESS NOTES
Patient up to bathroom with assist x 2.  /Unable to void at this time. Patient transferred to mother/baby room 354 per wheelchair in stable condition with baby and personal belongings. Accompanied by significant other and staff. Report given to mother/baby RN.   Pt former MD Dr Roseanne Mukherjee called and informed Labor nurse that he is fax prenatal records

## 2022-09-03 NOTE — H&P
[address] Patient Status:  Outpatient    1995 MRN [id number]   Location [address] [place] [address] Attending 540 Wayne Carrizales MD   Hosp Day # 0 PCP No primary care provider on file. Date of Admission:  4832      HPI:   Jacinta Mcconnell is a 32year old  female, current EGA of 38w3d with an estimated date of delivery of: Estimated Date of Delivery:       Jacinta Mcconnell is being admitted for labor management. Her current obstetrical history is significant for gbs pos per pt,  amp to be started now. .    Patient reports good fetal movement . Fetal Movement: normal.     History   Obstetric History:   OB History    Para Term  AB Living   7 5 5   1 5   SAB IAB Ectopic Multiple Live Births   1       5      # Outcome Date GA Lbr Manjinder/2nd Weight Sex Delivery Anes PTL Lv   7 Current            6 Term 20     NORMAL SPONT   TONE   5 Term 17     NORMAL SPONT   TONE   4 SAB 16           3 Term 06/13/15     NORMAL SPONT   TONE   2 Term 10/03/13     NORMAL SPONT   TONE   1 Term 10/02/12     NORMAL SPONT   TONE     Past Medical History: No past medical history on file. Past Social History: No past surgical history on file. Family History: No family history on file. Social History: Social History    Tobacco Use      Smoking status: Not on file      Smokeless tobacco: Not on file    Alcohol use: Not on file       Allergies/Medications: Allergies:   No Known Allergies  Medications:  No medications prior to admission.       Review of Systems:   As documented in HPI    no complaints and good fetal movement    Physical Exam:   Pulse:  [112] 112  BP: (131)/(88) 131/88    Constitutional: alert, appears stated age and cooperative  Respiratory: clear to auscultation bilaterally  Cardiac: regular rate and rhythm, S1, S2 normal, no murmur, click, rub or gallop  Abdomen: FHT present  Fetal Surveillance:  Reactive nst, cat 1  Sterile vaginal exam: deferred  Cervix: no lesions or cervical motion tenderness and not digitally examined    Results:     Lab Results   Component Value Date    TREPONEMALAB Negative 08/20/2022    RAPIDHIVSCRN Negative 08/20/2022    ABO O 08/20/2022    RH Positive 08/20/2022    HGB 11.4 08/20/2022    HCT 34.7 08/20/2022     08/20/2022       No results found for: DDIMER, ESRML, ESRPF, CRP, BNP, MG, PHOS, TROP, TROPHS, CK, CKMB, HAI, RPR, B12, ETOH, COLORUR, CLARITY, SPECGRAVITY, PROUR, GLUUR, KETUR, BILUR, BLOODURINE, NITRITE, UROBILINOGEN, LEUUR, UASA, POCGLU, BLDCUL, BLDSMR    No results found. Assessment/Plan:    Jose Osullivan is at an estimated gestational age of 36w4d with an estimated date of delivery of:  Estimated Date of Delivery: 9/14/22    Active phase labor. Obstetrical history significant for GBS colonizer. Admission problem(s):    * No active hospital problems. *        Risks, benefits, alternatives and possible complications have been discussed in detail with the patient. Pre-admission, admission, and post admission procedures and expectations were discussed in detail. All questions answered, all appropriate consents will be signed at the Hospital. Admission is planned for today. Continue present management. Isabel Hinojosa MD  9/3/2022  8:43 AM

## 2022-09-03 NOTE — ANESTHESIA PROCEDURE NOTES
Labor Analgesia    Date/Time: 9/3/2022 9:25 AM  Performed by: Cara Gentile MD  Authorized by: Cara Gentile MD       General Information and Staff    Start Time:  9/3/2022 9:20 AM  End Time:  9/3/2022 9:25 AM  Anesthesiologist:  Cara Gentile MD  Performed by:   Anesthesiologist  Patient Location:  OB  Reason for Block: labor epidural    Preanesthetic Checklist: patient identified, IV checked, site marked, risks and benefits discussed, monitors and equipment checked, pre-op evaluation, timeout performed, anesthesia consent and sterile technique used      Procedure Details    Patient Position:  Sitting  Prep: ChloraPrep    Monitoring:  Heart rate  Approach:  Midline    Epidural Needle    Injection Technique:  NIESHA saline  Needle Type:  Tuohy  Needle Gauge:  18 G  Needle Length:  3.5 in  Needle Insertion Depth:  5.5  Location:  L4-5    Spinal Needle    Needle Type:  Pencil-tip  Needle Gauge:  27 G    Catheter    Catheter Type:  Multi-orifice  Catheter Size:  20 G  Catheter at Skin Depth:  10.5  Test Dose:  Negative    Assessment  Sensory Level:  T10    Additional Comments

## 2022-09-03 NOTE — PLAN OF CARE
Problem: BIRTH - VAGINAL/ SECTION  Goal: Fetal and maternal status remain reassuring during the birth process  Description: INTERVENTIONS:  - Monitor vital signs  - Monitor fetal heart rate  - Monitor uterine activity  - Monitor labor progression (vaginal delivery)  - DVT prophylaxis (C/S delivery)  - Surgical antibiotic prophylaxis (C/S delivery)  Outcome: Progressing     Problem: PAIN - ADULT  Goal: Verbalizes/displays adequate comfort level or patient's stated pain goal  Description: INTERVENTIONS:  - Encourage pt to monitor pain and request assistance  - Assess pain using appropriate pain scale  - Administer analgesics based on type and severity of pain and evaluate response  - Implement non-pharmacological measures as appropriate and evaluate response  - Consider cultural and social influences on pain and pain management  - Manage/alleviate anxiety  - Utilize distraction and/or relaxation techniques  - Monitor for opioid side effects  - Notify MD/LIP if interventions unsuccessful or patient reports new pain  - Anticipate increased pain with activity and pre-medicate as appropriate  Outcome: Progressing     Problem: ANXIETY  Goal: Will report anxiety at manageable levels  Description: INTERVENTIONS:  - Administer medication as ordered  - Teach and rehearse alternative coping skills  - Provide emotional support with 1:1 interaction with staff  Outcome: Progressing     Problem: Patient Centered Care  Goal: Patient preferences are identified and integrated in the patient's plan of care  Description: Interventions:  - What would you like us to know as we care for you?   - Provide timely, complete, and accurate information to patient/family  - Incorporate patient and family knowledge, values, beliefs, and cultural backgrounds into the planning and delivery of care  - Encourage patient/family to participate in care and decision-making at the level they choose  - Honor patient and family perspectives and choices  Outcome: Progressing     Problem: Patient/Family Goals  Goal: Patient/Family Long Term Goal  Description: Patient's Long Term Goal:     Interventions:  -   - See additional Care Plan goals for specific interventions  Outcome: Progressing  Goal: Patient/Family Short Term Goal  Description: Patient's Short Term Goal:     Interventions:   -   - See additional Care Plan goals for specific interventions  Outcome: Progressing     Problem: POSTPARTUM  Goal: Long Term Goal:Experiences normal postpartum course  Description: INTERVENTIONS:  - Assess and monitor vital signs and lab values. - Assess fundus and lochia. - Provide ice/sitz baths for perineum discomfort. - Monitor healing of incision/episiotomy/laceration, and assess for signs and symptoms of infection and hematoma. - Assess bladder function and monitor for bladder distention.  - Provide/instruct/assist with pericare as needed. - Provide VTE prophylaxis as needed. - Monitor bowel function.  - Encourage ambulation and provide assistance as needed. - Assess and monitor emotional status and provide social service/psych resources as needed. - Utilize standard precautions and use personal protective equipment as indicated. Ensure aseptic care of all intravenous lines and invasive tubes/drains.  - Obtain immunization and exposure to communicable diseases history. Outcome: Progressing  Goal: Optimize infant feeding at the breast  Description: INTERVENTIONS:  - Initiate breast feeding within first hour after birth. - Monitor effectiveness of current breast feeding efforts. - Assess support systems available to mother/family.  - Identify cultural beliefs/practices regarding lactation, letdown techniques, maternal food preferences.   - Assess mother's knowledge and previous experience with breast feeding.  - Provide information as needed about early infant feeding cues (e.g., rooting, lip smacking, sucking fingers/hand) versus late cue of crying.  - Discuss/demonstrate breast feeding aids (e.g., infant sling, nursing footstool/pillows, and breast pumps). - Encourage mother/other family members to express feelings/concerns, and actively listen. - Educate father/SO about benefits of breast feeding and how to manage common lactation challenges. - Recommend avoidance of specific medications or substances incompatible with breast feeding.  - Assess and monitor for signs of nipple pain/trauma. - Instruct and provide assistance with proper latch. - Review techniques for milk expression (breast pumping) and storage of breast milk. Provide pumping equipment/supplies, instructions and assistance, as needed. - Encourage rooming-in and breast feeding on demand.  - Encourage skin-to-skin contact. - Provide LC support as needed. - Assess for and manage engorgement. - Provide breast feeding education handouts and information on community breast feeding support. Outcome: Progressing  Goal: Establishment of adequate milk supply with medication/procedure interruptions  Description: INTERVENTIONS:  - Review techniques for milk expression (breast pumping). - Provide pumping equipment/supplies, instructions, and assistance until it is safe to breastfeed infant. Outcome: Progressing  Goal: Experiences normal breast weaning course  Description: INTERVENTIONS:  - Assess for and manage engorgement. - Instruct on breast care. - Provide comfort measures. Outcome: Progressing  Goal: Appropriate maternal -  bonding  Description: INTERVENTIONS:  - Assess caregiver- interactions. - Assess caregiver's emotional status and coping mechanisms. - Encourage caregiver to participate in  daily care. - Assess support systems available to mother/family.  - Provide /case management support as needed. Outcome: Progressing    Patient received into room 354 via wheelchair. Beside report received from [facility].  Patient transferred to bed without incident. Bed in locked and low position. Side rails up x 2. VSS. Fundus firm at u/u, lochia small, no clots noted. IV site unremarkable. Pain level 0/10. Baby present at bedside in open crib, ID bands checked and verified. Patient/family oriented to unit, room and call light. Call light within patient reach. Reinforced to patient to call for assistance before getting out of bed to bathroom. Plan of care reviewed. Will continue to monitor per protocol.

## 2022-09-03 NOTE — PROGRESS NOTES
Pt is a 32year old female admitted to [address] [place] [address]. Patient presents with:  R/o Labor: Contractions every 2 mins  R/o Rom: leaking since 0800     Pt is  38w3d intra-uterine pregnancy. History obtained, consents signed. Oriented to room, staff, and plan of care.

## 2022-09-03 NOTE — PROGRESS NOTES
Pt is a 32year old female admitted to [address] [place] [address]. Patient presents with:  R/o Labor: Contractions every 2 mins  R/o Rom: leaking since 0800     Pt is  38w3d intra-uterine pregnancy. History obtained, consents signed. Oriented to room, staff, and plan of care.

## 2022-09-03 NOTE — PROGRESS NOTES
Called pts previous OB for prenatal records. DR Virgie Luke MD from [place], [place] [phone] and left a message to call me back.

## 2022-09-04 LAB
BASOPHILS # BLD AUTO: 0.03 X10(3) UL (ref 0–0.2)
BASOPHILS NFR BLD AUTO: 0.3 %
DEPRECATED RDW RBC AUTO: 48.8 FL (ref 35.1–46.3)
EOSINOPHIL # BLD AUTO: 0.04 X10(3) UL (ref 0–0.7)
EOSINOPHIL NFR BLD AUTO: 0.3 %
ERYTHROCYTE [DISTWIDTH] IN BLOOD BY AUTOMATED COUNT: 14.6 % (ref 11–15)
HCT VFR BLD AUTO: 29.1 %
HGB BLD-MCNC: 9.8 G/DL
IMM GRANULOCYTES # BLD AUTO: 0.03 X10(3) UL (ref 0–1)
IMM GRANULOCYTES NFR BLD: 0.3 %
LYMPHOCYTES # BLD AUTO: 1.78 X10(3) UL (ref 1–4)
LYMPHOCYTES NFR BLD AUTO: 15.1 %
MCH RBC QN AUTO: 30.9 PG (ref 26–34)
MCHC RBC AUTO-ENTMCNC: 33.7 G/DL (ref 31–37)
MCV RBC AUTO: 91.8 FL
MONOCYTES # BLD AUTO: 0.65 X10(3) UL (ref 0.1–1)
MONOCYTES NFR BLD AUTO: 5.5 %
NEUTROPHILS # BLD AUTO: 9.28 X10 (3) UL (ref 1.5–7.7)
NEUTROPHILS # BLD AUTO: 9.28 X10(3) UL (ref 1.5–7.7)
NEUTROPHILS NFR BLD AUTO: 78.5 %
PLATELET # BLD AUTO: 191 10(3)UL (ref 150–450)
RBC # BLD AUTO: 3.17 X10(6)UL
WBC # BLD AUTO: 11.8 X10(3) UL (ref 4–11)

## 2022-09-04 RX ORDER — MELATONIN
325
Status: DISCONTINUED | OUTPATIENT
Start: 2022-09-04 | End: 2022-09-05

## 2022-09-04 NOTE — PLAN OF CARE
Problem: PAIN - ADULT  Goal: Verbalizes/displays adequate comfort level or patient's stated pain goal  Description: INTERVENTIONS:  - Encourage pt to monitor pain and request assistance  - Assess pain using appropriate pain scale  - Administer analgesics based on type and severity of pain and evaluate response  - Implement non-pharmacological measures as appropriate and evaluate response  - Consider cultural and social influences on pain and pain management  - Manage/alleviate anxiety  - Utilize distraction and/or relaxation techniques  - Monitor for opioid side effects  - Notify MD/LIP if interventions unsuccessful or patient reports new pain  - Anticipate increased pain with activity and pre-medicate as appropriate  Outcome: Progressing     Problem: ANXIETY  Goal: Will report anxiety at manageable levels  Description: INTERVENTIONS:  - Administer medication as ordered  - Teach and rehearse alternative coping skills  - Provide emotional support with 1:1 interaction with staff  Outcome: Progressing     Problem: POSTPARTUM  Goal: Long Term Goal:Experiences normal postpartum course  Description: INTERVENTIONS:  - Assess and monitor vital signs and lab values. - Assess fundus and lochia. - Provide ice/sitz baths for perineum discomfort. - Monitor healing of incision/episiotomy/laceration, and assess for signs and symptoms of infection and hematoma. - Assess bladder function and monitor for bladder distention.  - Provide/instruct/assist with pericare as needed. - Provide VTE prophylaxis as needed. - Monitor bowel function.  - Encourage ambulation and provide assistance as needed. - Assess and monitor emotional status and provide social service/psych resources as needed. - Utilize standard precautions and use personal protective equipment as indicated. Ensure aseptic care of all intravenous lines and invasive tubes/drains.  - Obtain immunization and exposure to communicable diseases history.   Outcome: Progressing  Goal: Optimize infant feeding at the breast  Description: INTERVENTIONS:  - Initiate breast feeding within first hour after birth. - Monitor effectiveness of current breast feeding efforts. - Assess support systems available to mother/family.  - Identify cultural beliefs/practices regarding lactation, letdown techniques, maternal food preferences. - Assess mother's knowledge and previous experience with breast feeding.  - Provide information as needed about early infant feeding cues (e.g., rooting, lip smacking, sucking fingers/hand) versus late cue of crying.  - Discuss/demonstrate breast feeding aids (e.g., infant sling, nursing footstool/pillows, and breast pumps). - Encourage mother/other family members to express feelings/concerns, and actively listen. - Educate father/SO about benefits of breast feeding and how to manage common lactation challenges. - Recommend avoidance of specific medications or substances incompatible with breast feeding.  - Assess and monitor for signs of nipple pain/trauma. - Instruct and provide assistance with proper latch. - Review techniques for milk expression (breast pumping) and storage of breast milk. Provide pumping equipment/supplies, instructions and assistance, as needed. - Encourage rooming-in and breast feeding on demand.  - Encourage skin-to-skin contact. - Provide LC support as needed. - Assess for and manage engorgement. - Provide breast feeding education handouts and information on community breast feeding support. Outcome: Progressing  Goal: Establishment of adequate milk supply with medication/procedure interruptions  Description: INTERVENTIONS:  - Review techniques for milk expression (breast pumping). - Provide pumping equipment/supplies, instructions, and assistance until it is safe to breastfeed infant.   Outcome: Progressing  Goal: Experiences normal breast weaning course  Description: INTERVENTIONS:  - Assess for and manage engorgement. - Instruct on breast care. - Provide comfort measures.   Outcome: Progressing

## 2022-09-04 NOTE — PROGRESS NOTES
PPD 1    Pt doing well. No c/o     Alert and oriented nad  abd soft, nontender , fundus firm   Ext nt    Hb 9.8,  fe started    A/p  PPD 1  Pt doing well. No c/o.

## 2022-09-05 VITALS
DIASTOLIC BLOOD PRESSURE: 69 MMHG | SYSTOLIC BLOOD PRESSURE: 132 MMHG | TEMPERATURE: 98 F | RESPIRATION RATE: 18 BRPM | OXYGEN SATURATION: 98 % | HEART RATE: 74 BPM

## 2022-09-05 PROCEDURE — 3E0134Z INTRODUCTION OF SERUM, TOXOID AND VACCINE INTO SUBCUTANEOUS TISSUE, PERCUTANEOUS APPROACH: ICD-10-PCS | Performed by: OBSTETRICS & GYNECOLOGY

## 2022-09-05 RX ORDER — MELATONIN
325
Qty: 90 TABLET | Refills: 0 | Status: SHIPPED | OUTPATIENT
Start: 2022-09-05 | End: 2022-12-04

## 2022-09-05 RX ORDER — PSEUDOEPHEDRINE HCL 30 MG
100 TABLET ORAL 2 TIMES DAILY
Qty: 30 CAPSULE | Refills: 0 | Status: SHIPPED | OUTPATIENT
Start: 2022-09-05 | End: 2022-09-20

## 2022-09-05 RX ORDER — IBUPROFEN 600 MG/1
600 TABLET ORAL EVERY 6 HOURS PRN
Qty: 30 TABLET | Refills: 0 | Status: SHIPPED | OUTPATIENT
Start: 2022-09-05 | End: 2022-09-13

## 2022-09-05 NOTE — PLAN OF CARE
Problem: POSTPARTUM  Goal: Long Term Goal:Experiences normal postpartum course  Description: INTERVENTIONS:  - Assess and monitor vital signs and lab values. - Assess fundus and lochia. - Provide ice/sitz baths for perineum discomfort. - Monitor healing of incision/episiotomy/laceration, and assess for signs and symptoms of infection and hematoma. - Assess bladder function and monitor for bladder distention.  - Provide/instruct/assist with pericare as needed. - Provide VTE prophylaxis as needed. - Monitor bowel function.  - Encourage ambulation and provide assistance as needed. - Assess and monitor emotional status and provide social service/psych resources as needed. - Utilize standard precautions and use personal protective equipment as indicated. Ensure aseptic care of all intravenous lines and invasive tubes/drains.  - Obtain immunization and exposure to communicable diseases history. 2022 by Patrice Cerda RN  Outcome: Progressing  2022 by Patrice Cerda RN  Outcome: Progressing     Problem: POSTPARTUM  Goal: Experiences normal breast weaning course  Description: INTERVENTIONS:  - Assess for and manage engorgement. - Instruct on breast care. - Provide comfort measures. Outcome: Progressing     Problem: POSTPARTUM  Goal: Appropriate maternal -  bonding  Description: INTERVENTIONS:  - Assess caregiver- interactions. - Assess caregiver's emotional status and coping mechanisms. - Encourage caregiver to participate in  daily care. - Assess support systems available to mother/family.  - Provide /case management support as needed.   2022 by Patrice Cerda RN  Outcome: Progressing  2022 by Patrice Cerda RN  Outcome: Progressing

## 2022-09-05 NOTE — DISCHARGE PLANNING
Patient and family ready for discharge per MD orders. D/c instructions reviewed with family, verbalize understanding. All questions answered. Encouraged to call MD with any questions or concerns. Aware of need to set follow up appt and verbalize understanding of all medications. Patient and family left at this time with all belongings. Escorted out in stable condition with partner and baby by PCT to own vehicle.

## 2022-09-09 ENCOUNTER — PATIENT OUTREACH (OUTPATIENT)
Dept: CASE MANAGEMENT | Age: 27
End: 2022-09-09

## 2022-09-09 NOTE — PROGRESS NOTES
1st attempt OBGYN Post Partum apt request  (delivered 09/03)    Dr Demetrius Mitchell  [address] [place] [id number] [phone]  Follow up 6 weeks  Pt advised that she has already made her apt w/Dr Preciado Running  Closing encounter

## 2022-09-27 ENCOUNTER — TELEPHONE (OUTPATIENT)
Dept: OBGYN UNIT | Facility: HOSPITAL | Age: 27
End: 2022-09-27

## 2022-12-17 ENCOUNTER — NST DOCUMENTATION (OUTPATIENT)
Dept: OBGYN UNIT | Facility: HOSPITAL | Age: 27
End: 2022-12-17

## 2022-12-17 NOTE — NST
Nonstress Test   Patient: Jose Osullivan    Gestation: 53w3d    Diagnosis from order:         NST:         NST DOCUMENTATION 8/31/2022   Variability 6-25 BPM   Accelerations Yes   Decelerations None   Baseline 135   Uterine Irritability No   Contractions Regular   Contraction Frequency 3-5 min   Acoustic Stimulator No   Nonstress Test Interpretation Reactive   Nonstress Test Second Interpretation Reactive   FHR Category Category I   Comments [id number] presenting for r/o labor, SVE essentially unchanged after 4 hours of walking, rev'd pt with Dr Worley Sis, pt cleared for DC home   NST Completed by Mp Stephenson RN   Disposition Home   Provider Notified Roberto RAZA agree with the above evaluation. NST completed.   Finn Santaamria MD  12/17/2022  12:20 AM

## 2023-07-11 ENCOUNTER — TELEPHONE (OUTPATIENT)
Dept: OBGYN CLINIC | Facility: CLINIC | Age: 28
End: 2023-07-11

## 2023-07-11 NOTE — TELEPHONE ENCOUNTER
Patient who saw Dr Jannet Fink at [facility] hoping to get a refill of medication to treat BV. Currently has [facility]. If unable to prescribe, please call with guidance.

## 2023-07-13 RX ORDER — METRONIDAZOLE 7.5 MG/G
1 GEL VAGINAL NIGHTLY
Qty: 5 EACH | Refills: 0 | Status: SHIPPED | OUTPATIENT
Start: 2023-07-13 | End: 2023-07-18

## 2023-07-13 NOTE — TELEPHONE ENCOUNTER
Patient name and  verified. Patient informed of JJF message and recommendations. Verbalized understanding.